# Patient Record
Sex: FEMALE | NOT HISPANIC OR LATINO | Employment: PART TIME | URBAN - METROPOLITAN AREA
[De-identification: names, ages, dates, MRNs, and addresses within clinical notes are randomized per-mention and may not be internally consistent; named-entity substitution may affect disease eponyms.]

---

## 2019-10-15 ENCOUNTER — APPOINTMENT (OUTPATIENT)
Dept: CT IMAGING | Facility: HOSPITAL | Age: 19
End: 2019-10-15

## 2019-10-15 ENCOUNTER — HOSPITAL ENCOUNTER (EMERGENCY)
Facility: HOSPITAL | Age: 19
Discharge: HOME | End: 2019-10-16
Attending: EMERGENCY MEDICINE | Admitting: EMERGENCY MEDICINE

## 2019-10-15 VITALS
HEART RATE: 62 BPM | WEIGHT: 143.1 LBS | DIASTOLIC BLOOD PRESSURE: 69 MMHG | BODY MASS INDEX: 23 KG/M2 | SYSTOLIC BLOOD PRESSURE: 115 MMHG | OXYGEN SATURATION: 99 % | TEMPERATURE: 100.2 F | HEIGHT: 66 IN | RESPIRATION RATE: 16 BRPM

## 2019-10-15 DIAGNOSIS — R10.31 RIGHT LOWER QUADRANT ABDOMINAL PAIN: Primary | ICD-10-CM

## 2019-10-15 LAB
ALBUMIN SERPL-MCNC: 4.7 G/DL (ref 3.5–5.2)
ALBUMIN/GLOB SERPL: 1.6 G/DL
ALP SERPL-CCNC: 80 U/L (ref 39–117)
ALT SERPL W P-5'-P-CCNC: 15 U/L (ref 1–33)
ANION GAP SERPL CALCULATED.3IONS-SCNC: 13.8 MMOL/L (ref 5–15)
AST SERPL-CCNC: 21 U/L (ref 1–32)
B-HCG UR QL: NEGATIVE
BASOPHILS # BLD MANUAL: 0.24 10*3/MM3 (ref 0–0.2)
BASOPHILS NFR BLD AUTO: 3 % (ref 0–1.5)
BILIRUB SERPL-MCNC: 0.2 MG/DL (ref 0.2–1.2)
BILIRUB UR QL STRIP: NEGATIVE
BUN BLD-MCNC: 13 MG/DL (ref 6–20)
BUN/CREAT SERPL: 16.5 (ref 7–25)
CALCIUM SPEC-SCNC: 9.1 MG/DL (ref 8.6–10.5)
CHLORIDE SERPL-SCNC: 102 MMOL/L (ref 98–107)
CLARITY UR: CLEAR
CO2 SERPL-SCNC: 23.2 MMOL/L (ref 22–29)
COLOR UR: YELLOW
CREAT BLD-MCNC: 0.79 MG/DL (ref 0.57–1)
DEPRECATED RDW RBC AUTO: 45.6 FL (ref 37–54)
EOSINOPHIL # BLD MANUAL: 0.16 10*3/MM3 (ref 0–0.4)
EOSINOPHIL NFR BLD MANUAL: 2 % (ref 0.3–6.2)
ERYTHROCYTE [DISTWIDTH] IN BLOOD BY AUTOMATED COUNT: 13.4 % (ref 12.3–15.4)
GFR SERPL CREATININE-BSD FRML MDRD: 114 ML/MIN/1.73
GFR SERPL CREATININE-BSD FRML MDRD: 94 ML/MIN/1.73
GLOBULIN UR ELPH-MCNC: 2.9 GM/DL
GLUCOSE BLD-MCNC: 92 MG/DL (ref 65–99)
GLUCOSE UR STRIP-MCNC: NEGATIVE MG/DL
HCT VFR BLD AUTO: 42.2 % (ref 34–46.6)
HGB BLD-MCNC: 13.9 G/DL (ref 12–15.9)
HGB UR QL STRIP.AUTO: NEGATIVE
KETONES UR QL STRIP: NEGATIVE
LEUKOCYTE ESTERASE UR QL STRIP.AUTO: NEGATIVE
LIPASE SERPL-CCNC: 39 U/L (ref 13–60)
LYMPHOCYTES # BLD MANUAL: 3.68 10*3/MM3 (ref 0.7–3.1)
LYMPHOCYTES NFR BLD MANUAL: 46 % (ref 19.6–45.3)
LYMPHOCYTES NFR BLD MANUAL: 6 % (ref 5–12)
MCH RBC QN AUTO: 30.8 PG (ref 26.6–33)
MCHC RBC AUTO-ENTMCNC: 32.9 G/DL (ref 31.5–35.7)
MCV RBC AUTO: 93.4 FL (ref 79–97)
MONOCYTES # BLD AUTO: 0.48 10*3/MM3 (ref 0.1–0.9)
NEUTROPHILS # BLD AUTO: 3.44 10*3/MM3 (ref 1.7–7)
NEUTROPHILS NFR BLD MANUAL: 43 % (ref 42.7–76)
NITRITE UR QL STRIP: NEGATIVE
PH UR STRIP.AUTO: 7 [PH] (ref 5–8)
PLAT MORPH BLD: NORMAL
PLATELET # BLD AUTO: 265 10*3/MM3 (ref 140–450)
PMV BLD AUTO: 9.6 FL (ref 6–12)
POTASSIUM BLD-SCNC: 4.2 MMOL/L (ref 3.5–5.2)
PROT SERPL-MCNC: 7.6 G/DL (ref 6–8.5)
PROT UR QL STRIP: NEGATIVE
RBC # BLD AUTO: 4.52 10*6/MM3 (ref 3.77–5.28)
RBC MORPH BLD: NORMAL
SODIUM BLD-SCNC: 139 MMOL/L (ref 136–145)
SP GR UR STRIP: 1.02 (ref 1–1.03)
UROBILINOGEN UR QL STRIP: NORMAL
WBC MORPH BLD: NORMAL
WBC NRBC COR # BLD: 8 10*3/MM3 (ref 3.4–10.8)

## 2019-10-15 PROCEDURE — 81025 URINE PREGNANCY TEST: CPT | Performed by: PHYSICIAN ASSISTANT

## 2019-10-15 PROCEDURE — 83690 ASSAY OF LIPASE: CPT | Performed by: NURSE PRACTITIONER

## 2019-10-15 PROCEDURE — 81003 URINALYSIS AUTO W/O SCOPE: CPT | Performed by: NURSE PRACTITIONER

## 2019-10-15 PROCEDURE — 80053 COMPREHEN METABOLIC PANEL: CPT | Performed by: NURSE PRACTITIONER

## 2019-10-15 PROCEDURE — 85025 COMPLETE CBC W/AUTO DIFF WBC: CPT | Performed by: NURSE PRACTITIONER

## 2019-10-15 PROCEDURE — 25010000002 IOPAMIDOL 61 % SOLUTION: Performed by: EMERGENCY MEDICINE

## 2019-10-15 PROCEDURE — 74177 CT ABD & PELVIS W/CONTRAST: CPT

## 2019-10-15 PROCEDURE — 85007 BL SMEAR W/DIFF WBC COUNT: CPT | Performed by: NURSE PRACTITIONER

## 2019-10-15 PROCEDURE — 99284 EMERGENCY DEPT VISIT MOD MDM: CPT

## 2019-10-15 RX ORDER — ACETAMINOPHEN 500 MG
1000 TABLET ORAL ONCE
Status: COMPLETED | OUTPATIENT
Start: 2019-10-15 | End: 2019-10-15

## 2019-10-15 RX ORDER — DOXYCYCLINE HYCLATE 100 MG/1
100 CAPSULE ORAL 2 TIMES DAILY
COMMUNITY

## 2019-10-15 RX ORDER — HYDROCODONE BITARTRATE AND ACETAMINOPHEN 5; 325 MG/1; MG/1
1 TABLET ORAL EVERY 6 HOURS PRN
Qty: 12 TABLET | Refills: 0 | Status: SHIPPED | OUTPATIENT
Start: 2019-10-15

## 2019-10-15 RX ADMIN — IOPAMIDOL 85 ML: 612 INJECTION, SOLUTION INTRAVENOUS at 23:23

## 2019-10-15 RX ADMIN — ACETAMINOPHEN 1000 MG: 500 TABLET, FILM COATED ORAL at 22:47

## 2019-10-16 NOTE — ED TRIAGE NOTES
"Pt c/o RLQ pain, states \"I was seen at [facility] on Oct 1st for the same, they said I felt like I had fluid there, I was supposed to go back Oct 31st but the pain is getting worse\". Pt arrives aaox4, ambulatory with steady gait, abc's intact, NAD noted at this time.   "

## 2019-10-16 NOTE — ED NOTES
"Pt presents to er with c/o lower abdominal pain that is tender to touch onset 10/1/19. States she has been seen 2 times before this but is not getting any better. States \"I think I have an abscess. I had one once before in 2016 and it feels the same. I had to have surgery that time.\"     Daily Weeks, RN  10/15/19 7539    "

## 2019-10-16 NOTE — ED PROVIDER NOTES
MD ATTESTATION NOTE    Pt with history of tubo-ovarian abscess and hydrosalpinx presents to the ED complaining of RLQ and suprapubic abd pain with nausea, vomiting, and intermittent fever.    Reviewed pt's lab and imaging studies, including negative acute CT Abd, hCG Lipase, CMP, CBC, and UA.     On exam:    GENERAL: not distressed  ABDOMEN: soft, mild tenderness in RLQ  MUSCULOSKELETAL: no deformity  NEURO: alert, AGUILAR, FC  SKIN: warm, dry    Vital signs and nursing notes reviewed.    I agree with the plan to discharge pt home with instructions to follow up with Ob/Gyn.    The VALERIA and I have discussed this patient's history, physical exam, and treatment plan.  I have reviewed the documentation and personally had a face to face interaction with the patient. I affirm the documentation and agree with the treatment and plan.  The attached note describes my personal findings.    Documentation assistance provided by joel Avery for Dr. Lazo.  Information recorded by the scribe was done at my direction and has been verified and validated by me.     Laurie Avery  10/16/19 0006       Anatoly Lazo MD  10/16/19 4120

## 2019-10-16 NOTE — ED NOTES
DCI explained to pt. Verbalized understanding. Pt ambulated out of ER dept independently with NAD noted. A/O X4. MMM. RR E/U. SKIN PWD.     Daily Weeks RN  10/15/19 3166

## 2020-10-05 NOTE — ED PROVIDER NOTES
EMERGENCY DEPARTMENT ENCOUNTER    CHIEF COMPLAINT  Chief Complaint: abdominal pain  History given by: patient  History limited by: nothing  Time Seen: 2130  Room Number: 23/23  PMD: Provider, No Known   GYN: Dr. Maureen Espinoza ([place])      HPI:  Pt is a 18 y.o. female with hx of tubo-ovarian abscess and hydrosalpinx who presents with RLQ abdominal pain with radiation across her lower abdomen to her LLQ which began two weeks ago. The patient reports her abdominal pain is worsened with eating and movement. The patient also complains of associated nausea, vomiting, and an intermittent fever. The patient denies associated decreased appetite. The patient arrived to the ED with a 100.2. The patient reports she was seen at [facility] on 10/01/2019 for similar symptoms at which time she had a Pelvic US done which showed a right ovarian cyst. The patient reports she then followed up with her GYN on 10/02/2019 at which time she was started on birth control (per her GYN's recommendation) and was given a prescription for Doxycycline and Zofran. The patient reports she has taken her prescribed mediations as directed without relief of her symptoms. The patient reports she has an appointment scheduled with her GYN on 10/31/2019. There are no other complaints at this time. Pt's family at bedside.    Duration: two weeks  Timing: constant  Location: RLQ  Radiation: across her lower abdomen to LLQ  Quality: pain  Intensity/Severity: moderate  Progression: not specified  Associated Symptoms: nausea, vomiting, and intermittent fever  Aggravating Factors: eating and movement  Alleviating Factors: not specified  Previous Episodes: none specified  Treatment before arrival: The patient reports she was seen at [facility] on 10/01/2019 for similar symptoms at which time she had a Pelvic US done which showed a right ovarian cyst. The patient reports she then followed up with her GYN on 10/02/2019 at which time she was started  on birth control and was given a prescription for Doxycycline and Zofran. The patient reports she has taken her prescribed mediations as directed without relief of her symptoms.    PAST MEDICAL HISTORY  Active Ambulatory Problems     Diagnosis Date Noted   • No Active Ambulatory Problems     Resolved Ambulatory Problems     Diagnosis Date Noted   • No Resolved Ambulatory Problems     No Additional Past Medical History       PAST SURGICAL HISTORY  Past Surgical History:   Procedure Laterality Date   • ABSCESS DRAINAGE         FAMILY HISTORY  History reviewed. No pertinent family history.    SOCIAL HISTORY  Social History     Socioeconomic History   • Marital status: Single     Spouse name: Not on file   • Number of children: Not on file   • Years of education: Not on file   • Highest education level: Not on file   Tobacco Use   • Smoking status: Never Smoker   • Smokeless tobacco: Never Used   Substance and Sexual Activity   • Alcohol use: No     Frequency: Never   • Drug use: No   • Sexual activity: Yes         ALLERGIES  Patient has no known allergies.    REVIEW OF SYSTEMS  Review of Systems   Constitutional: Positive for fever. Negative for activity change, appetite change ( decreased), chills and fatigue.   HENT: Negative.  Negative for congestion, ear pain, rhinorrhea and sore throat.    Eyes: Negative.    Respiratory: Negative.  Negative for cough and shortness of breath.    Cardiovascular: Negative.  Negative for chest pain, palpitations and leg swelling ( pedal).   Gastrointestinal: Positive for abdominal pain (lower), nausea and vomiting. Negative for constipation and diarrhea.   Endocrine: Negative.    Genitourinary: Negative.  Negative for decreased urine volume, difficulty urinating, dysuria, menstrual problem, pelvic pain, urgency and vaginal discharge.   Musculoskeletal: Negative.  Negative for back pain.   Skin: Negative.  Negative for rash.   Allergic/Immunologic: Negative.    Neurological: Negative.   Negative for dizziness, weakness, light-headedness, numbness and headaches.   Hematological: Negative.    Psychiatric/Behavioral: Negative.  The patient is not nervous/anxious.    All other systems reviewed and are negative.      PHYSICAL EXAM  ED Triage Vitals [10/15/19 2117]   Temp Heart Rate Resp BP SpO2   100.2 °F (37.9 °C) 87 18 -- 99 %      Temp src Heart Rate Source Patient Position BP Location FiO2 (%)   Tympanic Monitor -- -- --       Physical Exam   Constitutional: She is well-developed, well-nourished, and in no distress. No distress.   HENT:   Head: Normocephalic and atraumatic.   Mouth/Throat: Oropharynx is clear and moist and mucous membranes are normal.   Eyes: Pupils are equal, round, and reactive to light.   Neck: Normal range of motion.   Cardiovascular: Normal rate, regular rhythm and normal heart sounds.   Pulmonary/Chest: Effort normal and breath sounds normal. She has no wheezes.   Abdominal: Soft. Bowel sounds are normal. There is tenderness in the right lower quadrant.   Musculoskeletal: Normal range of motion. She exhibits no edema.   Neurological: She is alert.   Skin: Skin is warm and dry. No rash noted.   Psychiatric: Mood, memory, affect and judgment normal.   Nursing note and vitals reviewed.      LAB RESULTS  Recent Results (from the past 24 hour(s))   Comprehensive Metabolic Panel    Collection Time: 10/15/19 10:08 PM   Result Value Ref Range    Glucose 92 65 - 99 mg/dL    BUN 13 6 - 20 mg/dL    Creatinine 0.79 0.57 - 1.00 mg/dL    Sodium 139 136 - 145 mmol/L    Potassium 4.2 3.5 - 5.2 mmol/L    Chloride 102 98 - 107 mmol/L    CO2 23.2 22.0 - 29.0 mmol/L    Calcium 9.1 8.6 - 10.5 mg/dL    Total Protein 7.6 6.0 - 8.5 g/dL    Albumin 4.70 3.50 - 5.20 g/dL    ALT (SGPT) 15 1 - 33 U/L    AST (SGOT) 21 1 - 32 U/L    Alkaline Phosphatase 80 39 - 117 U/L    Total Bilirubin 0.2 0.2 - 1.2 mg/dL    eGFR Non African Amer 94 >60 mL/min/1.73    eGFR  African Amer 114 >60 mL/min/1.73    Globulin  2.9 gm/dL    A/G Ratio 1.6 g/dL    BUN/Creatinine Ratio 16.5 7.0 - 25.0    Anion Gap 13.8 5.0 - 15.0 mmol/L   Lipase    Collection Time: 10/15/19 10:08 PM   Result Value Ref Range    Lipase 39 13 - 60 U/L   CBC Auto Differential    Collection Time: 10/15/19 10:08 PM   Result Value Ref Range    WBC 8.00 3.40 - 10.80 10*3/mm3    RBC 4.52 3.77 - 5.28 10*6/mm3    Hemoglobin 13.9 12.0 - 15.9 g/dL    Hematocrit 42.2 34.0 - 46.6 %    MCV 93.4 79.0 - 97.0 fL    MCH 30.8 26.6 - 33.0 pg    MCHC 32.9 31.5 - 35.7 g/dL    RDW 13.4 12.3 - 15.4 %    RDW-SD 45.6 37.0 - 54.0 fl    MPV 9.6 6.0 - 12.0 fL    Platelets 265 140 - 450 10*3/mm3   Manual Differential    Collection Time: 10/15/19 10:08 PM   Result Value Ref Range    Neutrophil % 43.0 42.7 - 76.0 %    Lymphocyte % 46.0 (H) 19.6 - 45.3 %    Monocyte % 6.0 5.0 - 12.0 %    Eosinophil % 2.0 0.3 - 6.2 %    Basophil % 3.0 (H) 0.0 - 1.5 %    Neutrophils Absolute 3.44 1.70 - 7.00 10*3/mm3    Lymphocytes Absolute 3.68 (H) 0.70 - 3.10 10*3/mm3    Monocytes Absolute 0.48 0.10 - 0.90 10*3/mm3    Eosinophils Absolute 0.16 0.00 - 0.40 10*3/mm3    Basophils Absolute 0.24 (H) 0.00 - 0.20 10*3/mm3    RBC Morphology Normal Normal    WBC Morphology Normal Normal    Platelet Morphology Normal Normal   Urinalysis With Microscopic If Indicated (No Culture) - Urine, Clean Catch    Collection Time: 10/15/19 10:43 PM   Result Value Ref Range    Color, UA Yellow Yellow, Straw    Appearance, UA Clear Clear    pH, UA 7.0 5.0 - 8.0    Specific Gravity, UA 1.024 1.005 - 1.030    Glucose, UA Negative Negative    Ketones, UA Negative Negative    Bilirubin, UA Negative Negative    Blood, UA Negative Negative    Protein, UA Negative Negative    Leuk Esterase, UA Negative Negative    Nitrite, UA Negative Negative    Urobilinogen, UA 0.2 E.U./dL 0.2 - 1.0 E.U./dL   Pregnancy, Urine - Urine, Clean Catch    Collection Time: 10/15/19 10:43 PM   Result Value Ref Range    HCG, Urine QL Negative Negative       I  ordered the above labs and reviewed the results    RADIOLOGY  CT Abdomen Pelvis With Contrast   Preliminary Result   IMPRESSION :    1. No acute inflammation or abnormal enhancement                          I ordered the above noted radiological studies and reviewed the images on the PACS system.    MEDICAL RECORD REVIEW  The patient was seen at [facility] on 10/01/2019. The Pelvic US impression follows.  Result Date: 10/1/2019  RADIOLOGY REPORT FACILITY: [facility] UNIT/AGE/GENDER: SHU ER AGE:18 Y SEX:F PATIENT NAME/: URMIAL HAN S 2000 UNIT NUMBER: [id number] ACCOUNT NUMBER: [id number] ACCESSION NUMBER: [id number] US PELVIS COMPLETE, TRANSABD + TRANSVAG DATE: 10/01/2019 TECHNIQUE: Real-time transabdominal and transvaginal grayscale and color Doppler imaging was performed. HISTORY: Right pelvic pain, hx tubo-ovarian abscess COMPARISON: 2016, 2016. FINDINGS: Transabdominal ultrasound is suboptimal due to poor acoustic window. Transvaginal ultrasound shows uterus to measure 7.2 x 3.2 x 4.1 cm. The myometrium is unremarkable. Endometrial thickness measures 6.9 mm and is within normal limits. No significant free fluid. The right ovary measures 4.2 x 2.0 x 1.7 cm. Adjacent right ovary is a complex fluid collection grossly measuring 2.7 x 1.5 x 1.5 cm possibly presenting a complex cyst such as hemorrhagic cyst. There is no hyperemia identified in the complex collection. The right fallopian tube appears dilated. Blood flow preserved the right ovary. Left ovary measures 3.2 x 1.5 x 1.7 cm and appears within normal limits. Blood flow preserved left ovary. IMPRESSION: Right hydrosalpinx. Complex collection adjacent the right ovary measuring 2.7 x 1.5 x 1.5 cm favors a complex cyst such as a hemorrhagic cyst. There is no hyperemia identified of the complex collection. An underlying early tubo-ovarian abscess not categorically excluded. Recommend continued  "clinical and imaging follow-up. Dictated by: Leyda Gutiérrez M.D. Images and Report reviewed and interpreted by: Leyda Gutiérrez M.D. <PS><Electronically signed by: Leyda Gutiérrez M.D.> 10/01/2019 0909 D: 10/01/2019 0906 T: 10/01/2019 0906      PROGRESS AND CONSULTS  2340- Reviewed pt's history and workup with Dr. Lazo.   After a bedside evaluation;       COURSE & MEDICAL DECISION MAKING  Pertinent Labs and Imaging studies that were ordered and reviewed are noted above.  Results were reviewed/discussed with the patient and they were also made aware of online assess.   Pt also made aware that some labs, such as cultures, will not be resulted during ER visit and follow up with PMD is necessary.     MEDICATIONS GIVEN IN ER  Medications   acetaminophen (TYLENOL) tablet 1,000 mg (1,000 mg Oral Given 10/15/19 2597)   iopamidol (ISOVUE-300) 61 % injection 100 mL (85 mL Intravenous Given by Other 10/15/19 0550)       /69   Pulse 74   Temp 100.2 °F (37.9 °C) (Tympanic)   Resp 16   Ht 167.6 cm (66\")   Wt 64.9 kg (143 lb 1.6 oz)   LMP 09/14/2019   SpO2 99%   Breastfeeding? No   BMI 23.10 kg/m²        Discussed all results and noted any abnormalities with patient.  Discussed absoute need to recheck abnormalities with Dr. Espinoza    Reviewed implications of results, diagnosis, meds, responsibility to follow up, warning signs and symptoms of possible worsening, potential complications and reasons to return to ER with patient    Discussed plan for discharge, as there is no emergent indication for admission.  Pt is agreeable and understands need for follow up and repeat testing.  Pt is aware that discharge does not mean that nothing is wrong but it indicates no emergency is present and they must continue care with Dr. Espinoza.  Pt is discharged with instructions to follow up with primary care doctor to have their blood pressure rechecked.       DIAGNOSIS  Final diagnoses:   Right lower quadrant abdominal pain "       FOLLOW UP   Maureen Espinoza MD  [address] [id number] [phone]    Schedule an appointment as soon as possible for a visit         RX     Medication List      New Prescriptions    HYDROcodone-acetaminophen 5-325 MG per tablet  Commonly known as:  NORCO  Take 1 tablet by mouth Every 6 (Six) Hours As Needed for Severe Pain .        I personally reviewed the past medical history, past surgical history, social history, family history, current medications and allergies as they appear in this chart.  The scribe's note accurately reflects the work and decisions made by me.       Documentation assistance provided by joel Causey for DANYEL Hernandez.  Information recorded by the scribe was done at my direction and has been verified and validated by me.       Sadia Causey  10/15/19 3901       Kizzy Wall APRN  10/15/19 3464     no